# Patient Record
Sex: MALE | Race: OTHER | HISPANIC OR LATINO | URBAN - METROPOLITAN AREA
[De-identification: names, ages, dates, MRNs, and addresses within clinical notes are randomized per-mention and may not be internally consistent; named-entity substitution may affect disease eponyms.]

---

## 2021-06-10 ENCOUNTER — EMERGENCY (EMERGENCY)
Facility: HOSPITAL | Age: 76
LOS: 1 days | Discharge: HOME | End: 2021-06-10
Attending: EMERGENCY MEDICINE
Payer: MEDICAID

## 2021-06-10 VITALS
RESPIRATION RATE: 16 BRPM | HEART RATE: 70 BPM | DIASTOLIC BLOOD PRESSURE: 80 MMHG | SYSTOLIC BLOOD PRESSURE: 145 MMHG | TEMPERATURE: 98 F | WEIGHT: 173.06 LBS | OXYGEN SATURATION: 98 %

## 2021-06-10 PROCEDURE — 99284 EMERGENCY DEPT VISIT MOD MDM: CPT

## 2021-06-10 PROCEDURE — 72110 X-RAY EXAM L-2 SPINE 4/>VWS: CPT | Mod: 26

## 2021-06-10 PROCEDURE — 99284 EMERGENCY DEPT VISIT MOD MDM: CPT | Mod: 25

## 2021-06-10 PROCEDURE — 72170 X-RAY EXAM OF PELVIS: CPT | Mod: 26

## 2021-06-10 PROCEDURE — 72170 X-RAY EXAM OF PELVIS: CPT

## 2021-06-10 PROCEDURE — 73030 X-RAY EXAM OF SHOULDER: CPT

## 2021-06-10 PROCEDURE — 73030 X-RAY EXAM OF SHOULDER: CPT | Mod: 26,RT

## 2021-06-10 PROCEDURE — 71046 X-RAY EXAM CHEST 2 VIEWS: CPT | Mod: 26

## 2021-06-10 PROCEDURE — 71046 X-RAY EXAM CHEST 2 VIEWS: CPT

## 2021-06-10 PROCEDURE — 72110 X-RAY EXAM L-2 SPINE 4/>VWS: CPT

## 2021-06-10 RX ORDER — IBUPROFEN 200 MG
600 TABLET ORAL ONCE
Refills: 0 | Status: COMPLETED | OUTPATIENT
Start: 2021-06-10 | End: 2021-06-10

## 2021-06-10 RX ADMIN — Medication 600 MILLIGRAM(S): at 18:57

## 2021-06-10 NOTE — ED PROVIDER NOTE - PATIENT PORTAL LINK FT
You can access the FollowMyHealth Patient Portal offered by [facility] by registering at the following website: [email or web address]. By joining [facility]’s FollowMyHealth portal, you will also be able to view your health information using other applications (apps) compatible with our system.

## 2021-06-10 NOTE — ED PROVIDER NOTE - OBJECTIVE STATEMENT
[id number].  76yoM with h/o DM, arthritis, on ASA 81, no other antiplt/coag agents, presents with fall. States last night he tripped while walking on the street, fell onto his R shoulder. Since then has had R shoulder and generalized low back soreness, nonradiating, improved with Tylenol. Denies hematuria, dysuria, incontinence, HA, head trauma, LOC, CP, SOB, abd pain, vomiting, dizziness, and all other symptoms.

## 2021-06-10 NOTE — ED PROVIDER NOTE - CARE PLAN
Principal Discharge DX:	Fall, initial encounter  Secondary Diagnosis:	Acute pain of right shoulder  Secondary Diagnosis:	Acute bilateral low back pain without sciatica

## 2021-06-10 NOTE — ED PROVIDER NOTE - CLINICAL SUMMARY MEDICAL DECISION MAKING FREE TEXT BOX
Mechanical fall. No chest/abdominal/head/hip trauma. Exam low suspicion for acute fx and Xrays negative. No signs of cord compression. Hx of arthritis and this may be exacerbating. Patient is well appearing, NAD, afebrile, hemodynamically stable, walking in ED and requesting discharge paperwork. Any available tests and studies were discussed with patient. Discharged with instructions in further symptomatic care, return precautions, and need for PMD f/u.

## 2021-06-10 NOTE — ED ADULT NURSE NOTE - OBJECTIVE STATEMENT
[id number] Patent presents with c/o R shoulder pain, back pain S/P mech fall yesterday. denies head trauma, LOC, dizziness.

## 2021-06-10 NOTE — ED PROVIDER NOTE - NSFOLLOWUPINSTRUCTIONS_ED_ALL_ED_FT
Wesley un seguimiento con saleem médico de atención primaria en 1-2 días.  Utilice ibuprofeno o acetaminofén según sea necesario para el dolor.  Regrese al departamento de emergencias si el dolor, los vómitos, los mareos, la falta de aire o cualquier otro síntoma empeoran.    Dolor carson de la parte inferior de la espalda    LO QUE NECESITA SABER:    El dolor carson de la región lumbar de la espalda es tricia molestia repentina que dura hasta 6 semanas y que dificulta la actividad.    INSTRUCCIONES SOBRE EL TIGIST HOSPITALARIA:    Regrese a la delano de emergencias si:  •Usted tiene dolor intenso.  •Usted repentinamente tiene rigidez o siente pesadez en ambos glúteos hacia abajo de ambas piernas.  •Usted tiene entumecimiento o debilidad en tricia pierna o dolor en ambas piernas.  •Usted tiene entumecimiento en el área genital o en la región lumbar.  •Usted no puede controlar saleem orina ni jessika deposiciones intestinales.    Llame a saleem médico si:  •Tiene fiebre.  •Usted tiene un dolor por la noche o cuando descansa.  •Saleem dolor no mejora con el tratamiento  •Usted tiene dolor que empeora cuando tose o estornuda.  •Usted siente un estallido o chasquido repentino en saleem espalda.  •Usted tiene preguntas o inquietudes acerca de saleem condición o cuidado.    Medicamentos:Es posible que usted necesite alguno de los siguientes:  •Los KURT,elfego el ibuprofeno, ayudan a disminuir la inflamación, el dolor y la fiebre. Mc medicamento está disponible con o sin tricia receta médica. Los KURT pueden causar sangrado estomacal o problemas renales en ciertas personas. Si usted angel un medicamento anticoagulante, siempre pregúntele a saleem médico si los KURT son seguros para usted. Siempre alexandria la etiqueta de mc medicamento y siga las instrucciones.  •Acetaminofénalivia el dolor y baja la fiebre. Está disponible sin receta médica. Pregunte la cantidad y la frecuencia con que debe tomarlos. Siga las indicaciones. [place] las etiquetas de todos los demás medicamentos que esté usando para saber si también contienen acetaminofén, o pregunte a saleem médico o farmacéutico. El acetaminofén puede causar daño en el hígado cuando no se angel de forma correcta. No use más de 4 gramos (4000 miligramos) en total de acetaminofeno en un día  •Relajantes muscularesdisminuyen el dolor y relajan los músculos de la parte inferior de la columna.  •Puede administrarsepodrían administrarse. Pregunte al médico cómo debe sara mc medicamento de forma blanco. Algunos medicamentos recetados para el dolor contienen acetaminofén. No tome otros medicamentos que contengan acetaminofén sin consultarlo con saleem médico. Demasiado acetaminofeno puede causar daño al hígado. Los medicamentos recetados para el dolor podrían causar estreñimiento. Pregunte a saleem médico elfego prevenir o tratar estreñimiento    Cuidados personales:  •Manténgase activolo más que pueda sin causar más dolor. El reposo en cama puede empeorar saleem dolor de espalda. Comience con ejercicios ligeros, elfego caminar. Evite levantar objetos hasta que ya no tenga dolor. Solicite más información acerca de las actividades físicas o plan de ejercicios que son los adecuados para usted.  Bernardino hispana caminando elfego ejercicio  •Aplique caloren la espalda de 20 a 30 minutos cada 2 horas por los días que le indiquen. El calor ayuda a disminuir el dolor y los espasmos musculares. Alterne entre el calor y el hielo.  •Aplique hieloen la espalda de 15 a 20 minutos cada hora o elfego se le indique. Use tricia compresa de hielo o ponga hielo triturado en tricia bolsa de plástico. Cúbralo con tricia toalla antes de aplicarlo sobre saleem piel. El hielo ayuda a evitar daño al tejido y a disminuir la inflamación y el dolor.    Prevenir el dolor carson de la parte inferior de la espalda:  •Use la mecánica corporal adecuada.?Flexione la cadera y las rodillas cuando vaya a levantar un objeto. No doble la cintura. Utilice los músculos de las piernas mientras levanta saleem carga. No use saleem espalda. Mantenga el objeto cerca de saleem pecho mientras lo levanta. No se tuerza, ni levante cualquier cosa por encima de saleem cintura.  ?Cambie saleem posición frecuentemente cuando pase mucho tiempo de pie. Descanse un pie sobre tricia caja pequeña o un reposapiés e intercambie con el otro pie frecuentemente.  ?No permanezca sentado por lapsos de tiempo prolongados. Cuando sea necesario hacerlo, siéntese en tricia silla de respaldo recto con los pies apoyados en el suelo. Nunca alcance, jale ni empuje mientras se encuentra sentando.  •Wesley ejercicios que fortalezcan jessika músculos de la espalda.Entre en calor antes de hacer ejercicio. Consulte con saleem médico sobre el mejor plan de ejercicios para usted.  •Mantenga un peso saludable.Pregúntele a saleem médico cuál es el peso ideal para usted. Pídale que lo ayude a crear un plan para bajar de peso si tiene sobrepeso.    Acuda a la consulta de control con saleem médico según las indicaciones:Regrese a tricia sincere de seguimiento si usted aún tiene dolor después de 1 a 3 semanas de tratamiento. Flex vez necesite acudir con un ortopedista si saleem dolor de espalda dura más de 12 semanas. Anote jessika preguntas para que se acuerde de hacerlas lisy jessika visitas.

## 2021-06-10 NOTE — ED PROVIDER NOTE - PHYSICAL EXAMINATION
Afebrile, hemodynamically stable, saturating well  NAD, well appearing, sitting comfortably in chair  Head NCAT, no CTL spine TTP/stepoff/deformity  EOMI, anicteric  MMM  No JVD  RRR, nml S1/S2, no m/r/g  Lungs CTAB, no w/r/r  Abd soft, NT, ND, nml BS, no rebound or guarding  AAO, CN's 3-12 grossly intact  MÉNDEZ spontaneously, no leg cyanosis or edema, no hip stepoff/deformity  No specific bony shoulder TTP/stepoff/deformity, generalized low back tenderness with palpation over muscle bellies  Skin warm, well perfused, no rashes or hives